# Patient Record
Sex: FEMALE | Race: WHITE | Employment: FULL TIME | URBAN - METROPOLITAN AREA
[De-identification: names, ages, dates, MRNs, and addresses within clinical notes are randomized per-mention and may not be internally consistent; named-entity substitution may affect disease eponyms.]

---

## 2024-06-24 ENCOUNTER — HOSPITAL ENCOUNTER (OUTPATIENT)
Dept: RADIOLOGY | Facility: CLINIC | Age: 64
Discharge: HOME | End: 2024-06-24
Payer: COMMERCIAL

## 2024-06-24 ENCOUNTER — OFFICE VISIT (OUTPATIENT)
Dept: ORTHOPEDIC SURGERY | Facility: CLINIC | Age: 64
End: 2024-06-24
Payer: COMMERCIAL

## 2024-06-24 DIAGNOSIS — M54.16 LUMBAR RADICULOPATHY: Primary | ICD-10-CM

## 2024-06-24 DIAGNOSIS — M48.061 DEGENERATIVE LUMBAR SPINAL STENOSIS: ICD-10-CM

## 2024-06-24 DIAGNOSIS — M54.50 ACUTE LEFT-SIDED LOW BACK PAIN, UNSPECIFIED WHETHER SCIATICA PRESENT: ICD-10-CM

## 2024-06-24 PROCEDURE — 99213 OFFICE O/P EST LOW 20 MIN: CPT | Performed by: INTERNAL MEDICINE

## 2024-06-24 PROCEDURE — 99203 OFFICE O/P NEW LOW 30 MIN: CPT | Performed by: INTERNAL MEDICINE

## 2024-06-24 PROCEDURE — 72110 X-RAY EXAM L-2 SPINE 4/>VWS: CPT | Performed by: INTERNAL MEDICINE

## 2024-06-24 PROCEDURE — 72110 X-RAY EXAM L-2 SPINE 4/>VWS: CPT

## 2024-06-24 NOTE — PROGRESS NOTES
Acute Injury New Patient Visit    CC:   Chief Complaint   Patient presents with    Lower Back - Pain     Low back pain radiating down left leg  Xrays today       HPI: Ada is a 64 y.o. female presents today for evaluation for low back injury sustained after a fall last week. She notes worsening pain since the weekend. She saw a chiropractor. She notes pain radiating down her left leg. She is a physiotherapist. She is here for initial evaluation and x-rays.  She was prescribed a Medrol Dosepak which she has not taken yet.  She denies any stool or urine incontinence.        Review of Systems   GENERAL: Negative for malaise, significant weight loss, fever  MUSCULOSKELETAL: See HPI  NEURO:  Negative for numbness / tingling     Past Medical History  No past medical history on file.    Medication review  Medication Documentation Review Audit    **Prior to Admission medications have not yet been reviewed**         Allergies  Not on File    Social History  Social History     Socioeconomic History    Marital status: Single     Spouse name: Not on file    Number of children: Not on file    Years of education: Not on file    Highest education level: Not on file   Occupational History    Not on file   Tobacco Use    Smoking status: Not on file    Smokeless tobacco: Not on file   Substance and Sexual Activity    Alcohol use: Not on file    Drug use: Not on file    Sexual activity: Not on file   Other Topics Concern    Not on file   Social History Narrative    Not on file     Social Determinants of Health     Financial Resource Strain: Low Risk  (12/17/2023)    Received from [facility]    Overall Financial Resource Strain (CARDIA)     Difficulty of Paying Living Expenses: Not hard at all   Food Insecurity: No Food Insecurity (12/17/2023)    Received from [facility]    Hunger Vital Sign     Worried About Running Out of Food in the Last Year: Never true     Ran Out of Food in the Last Year: Never true   Transportation Needs: No  Transportation Needs (12/17/2023)    Received from [facility]    PRAPARE - Transportation     Lack of Transportation (Medical): No     Lack of Transportation (Non-Medical): No   Physical Activity: Inactive (12/17/2023)    Received from [facility]    Exercise Vital Sign     Days of Exercise per Week: 0 days     Minutes of Exercise per Session: 0 min   Stress: No Stress Concern Present (12/17/2023)    Received from [facility]    [employer] - Occupational Stress Questionnaire     Feeling of Stress : Only a little   Social Connections: Socially Isolated (12/17/2023)    Received from [facility]    Social Connection and Isolation Panel [NHANES]     Frequency of Communication with Friends and Family: Once a week     Frequency of Social Gatherings with Friends and Family: Once a week     Attends L[employer]-day Services: Never     Active Member of Clubs or Organizations: No     Attends Club or Organization Meetings: Never     Marital Status:    Intimate Partner Violence: Not At Risk (12/17/2023)    Received from [facility]    Humiliation, Afraid, Rape, and Kick questionnaire     Fear of Current or Ex-Partner: No     Emotionally Abused: No     Physically Abused: No     Sexually Abused: No   Housing Stability: Low Risk  (12/17/2023)    Received from [facility]    Housing Stability Vital Sign     Unable to Pay for Housing in the Last Year: No     Number of Places Lived in the Last Year: 1     Unstable Housing in the Last Year: No       Surgical History  No past surgical history on file.    Physical Exam:  GENERAL:  Patient is awake, alert, and oriented to person place and time.  Patient appears well nourished and well kept.  Affect Calm, Not Acutely Distressed.  HEENT:  Normocephalic, Atraumatic, EOMI  CARDIOVASCULAR:  Hemodynamically stable.  RESPIRATORY:  Normal respirations with unlabored breathing.  Extremity: Lumbar spine examination shows skin is intact.  Mild midline lumbar  tenderness.  Mild paraspinal tenderness.  She can forward flex with no pain.  Pain with reverse extension.  Quadricep strength is 5 / 5 in the left and right.  Positive straight leg test on the left leg.  She is able to walk on tiptoes and heels.  Distal pulses are palpable.      Diagnostics: X-rays reviewed      Procedure: None    Assessment:   Lumbar DDD  Lumbar stenosis with left-sided lumbar radiculopathy    Plan: Ada presents today for initial evaluation for acute right low back pain which started last week. X-rays showed no obvious fractures, with scoliosis and arthritic changes. We recommended MRI of the lumbar spine to evaluate for lumbar stenosis and for persistent left-sided lumbar radiculopathy and for preoperative planning, and will also begin physical therapy. She will continue t oral steroids and shwe will follow-up with the spine team after the MRI.     Orders Placed This Encounter    XR lumbar spine complete 4+ views      At the conclusion of the visit there were no further questions by the patient/family regarding their plan of care.  Patient was instructed to call or return with any issues, questions, or concerns regarding their injury and/or treatment plan described above.     06/24/24 at 11:32 AM - Tomás Dick MD  Scribe Attestation  By signing my name below, I, Sukhjinder Sally Mckoy   attest that this documentation has been prepared under the direction and in the presence of Tomás Dick MD.    Office: [phone]    This note was prepared using voice recognition software.  The details of this note are correct and have been reviewed, and corrected to the best of my ability.  Some grammatical errors may persist related to the Dragon software.

## 2024-07-03 ENCOUNTER — OFFICE VISIT (OUTPATIENT)
Dept: ORTHOPEDIC SURGERY | Facility: CLINIC | Age: 64
End: 2024-07-03
Payer: COMMERCIAL

## 2024-07-03 DIAGNOSIS — M54.16 LUMBAR RADICULOPATHY: Primary | ICD-10-CM

## 2024-07-03 PROCEDURE — 99214 OFFICE O/P EST MOD 30 MIN: CPT | Performed by: PHYSICIAN ASSISTANT

## 2024-07-03 RX ORDER — MELOXICAM 15 MG/1
15 TABLET ORAL DAILY
Qty: 30 TABLET | Refills: 0 | Status: SHIPPED | OUTPATIENT
Start: 2024-07-03 | End: 2024-08-02

## 2024-07-03 NOTE — PROGRESS NOTES
New patient just established with the practice comes to the office complaining of low back pain left leg pain.  She had a history of chronic history of low back pain but after the fall now she has the leg pain which goes all the way down the leg to the foot with numbness and tingling.  She saw one of her colleagues who has been on a Medrol Dosepak she not really taking anything else.  She is in physical therapy and the therapist told her that he believes he has she has a herniated disc at L4-5.  Patient denies bowel or bladder complaints saddle anesthesia gait or balance changes.  Denies any spine surgery.    Looked at patient's recent blood work.  Check a metabolic panel which showed glucose 95 sodium 141 potassium 4.3 we checked a vitamin D level which is 53 we also checked primary doctors note check medication list I do not see that patient is on a type of statin medication to cause muscular aches and pains in the lower extremities.    Physical exam: Well-nourished, well kept.  No lymphangitis or lymphadenopathy in the examined extremities.  Good perfusion to the extremities ×4.  Radial and dorsalis pedis pulses 2+.  Capillary refill to all 4 digits brisk.  No distal edema x 4.  Patient in a wheelchair secondary to pain but able to stand and walk.  Examination of the neck reveals no swelling, step-off, or point tenderness.  Range of motion with flexion, extension, side bending and rotation is well maintained without crepitance, instability, or exacerbation of pain.  Strength is within normal limits.  Decreased range of motion flexion-extension rotation lumbar spine tender good strength no instability.  Examination of the upper extremities reveals no point tenderness, swelling, or deformity.  Range of motion of the shoulders, elbows, wrists, and fingers are all full without crepitance, instability, or exacerbation of pain.  Strength is 5/5 throughout.  Some mild weakness on dorsiflexion on the left lower extremity  otherwise examination of the lower extremities reveals no point tenderness, swelling, or deformity.  Range of motion of the hips, knees, and ankles are full without crepitance, instability, or exacerbation of pain.  Strength is 5/5 throughout.  No redness, abrasions, or lesions on all 4 extremities, head and neck, or trunk.  Gross sensation intact in the extremities ×4.  Deep tendon reflexes 2+ and symmetric bilaterally.  Zohaib, clonus, and Babinski were negative.  Straight leg raise negative.  Affect normal.  Alert and oriented ×3.  Coordination normal.    X-ray: X-ray lumbar spine taken today and reviewed shows a scoliosis measuring about 17 degrees.  There is a spondylolisthesis L4-5 which is slightly dynamic on flexion-extension and severe atherosclerotic disease noted in the descending vessels.  Looks like there be a mild compression of L3.  Not quite sure how true that is there is a shadow on that x-ray.    Assessment: This a patient with low back pain radicular pain chronic history of back pain.  With now acute exacerbation of symptoms with leg symptoms all the way down to the foot with sensory changes also.  Also due to the increase in pain we had concerns of a stress fracture so we need to get a MRI soon as possible.    Plan: Will try an anti-inflammatory for the patient and we will see her back after the MRI

## 2024-07-12 ENCOUNTER — APPOINTMENT (OUTPATIENT)
Dept: PHYSICAL THERAPY | Facility: CLINIC | Age: 64
End: 2024-07-12
Payer: COMMERCIAL

## 2024-07-19 ENCOUNTER — HOSPITAL ENCOUNTER (OUTPATIENT)
Dept: RADIOLOGY | Facility: HOSPITAL | Age: 64
Discharge: HOME | End: 2024-07-19
Payer: COMMERCIAL

## 2024-07-19 DIAGNOSIS — M54.16 LUMBAR RADICULOPATHY: ICD-10-CM

## 2024-07-19 DIAGNOSIS — M48.061 DEGENERATIVE LUMBAR SPINAL STENOSIS: ICD-10-CM

## 2024-07-19 DIAGNOSIS — M54.50 ACUTE LEFT-SIDED LOW BACK PAIN, UNSPECIFIED WHETHER SCIATICA PRESENT: ICD-10-CM

## 2024-07-19 PROCEDURE — 72148 MRI LUMBAR SPINE W/O DYE: CPT

## 2024-07-31 ENCOUNTER — OFFICE VISIT (OUTPATIENT)
Dept: ORTHOPEDIC SURGERY | Facility: CLINIC | Age: 64
End: 2024-07-31
Payer: COMMERCIAL

## 2024-07-31 DIAGNOSIS — S32.000A LUMBAR COMPRESSION FRACTURE, CLOSED, INITIAL ENCOUNTER (MULTI): Primary | ICD-10-CM

## 2024-07-31 PROCEDURE — 99213 OFFICE O/P EST LOW 20 MIN: CPT | Performed by: PHYSICIAN ASSISTANT

## 2024-07-31 PROCEDURE — 99214 OFFICE O/P EST MOD 30 MIN: CPT | Performed by: PHYSICIAN ASSISTANT

## 2024-07-31 NOTE — PROGRESS NOTES
Established patient follow-up MRI lumbar spine.  Patient states her back pain is tolerable actually getting better.  Leg pain is actually even better.  So she is doing fairly well.  She has a follow-up with the elbow surgeon regarding her elbow fracture.  Patient denies radiculopathy bowel or bladder complaints saddle anesthesia.    Physical exam: Well-nourished, well kept.  No lymphangitis or lymphadenopathy in the examined extremities.  Good perfusion to the lower extremities bilaterally.  Dorsalis pedis pulses 2+.  Capillary refill to the digits brisk.  No distal edema.  Gait normal.  Can walk on heels and toes.  Examination of the back reveals no swelling, step-off, or point tenderness.  Range of motion with flexion, extension, side bending and rotation is well maintained without crepitance, instability, or exacerbation of pain.  Strength is within normal limits.  Examination of the lower extremities reveals no point tenderness, swelling, or deformity.  Range of motion of the hips, knees, and ankles are full without crepitance, instability, or exacerbation of pain.  Strength is 5/5 throughout.  No redness, abrasions, or lesions on all 4 extremities, head and neck, or trunk.  Gross sensation intact in the extremities ×4.  Deep tendon reflexes 2+ and symmetric bilaterally.  Zohaib, clonus, and Babinski were negative.  Straight leg raise negative.  Affect normal.  Alert and oriented ×3.  Coordination normal.    MRI: MRI reviewed report is reviewed as well showing compression fracture of superior endplate of L3 and L5 there is some epidural lipomatosis.  But no major disc herniations central stenosis or foraminal stenosis.    Assessment: This a patient with compression fractures L3 L5 pain is very tolerable very minimal at this point.  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  She still seeing the orthopedic elbow surgeon also.    Plan: From our standpoint orthopedic spine standpoint she is cleared to  go to work whenever she is cleared from the elbow surgeon.  She will follow-up with us as needed

## 2024-07-31 NOTE — LETTER
July 31, 2024     Patient: Ada Caceres   YOB: 1960   Date of Visit: 7/31/2024       To Whom it May Concern:    Ada Caceres was seen in my clinic on 7/31/2024. She was seen in our office for follow-up for back pain.  Diagnosed with compression fractures.  Patient is able to return to work from our standpoint when she is cleared from the orthopedic elbow surgeon..    If you have any questions or concerns, please don't hesitate to call.         Sincerely,          Ty Mcintosh PA-C        CC: No Recipients

## 2024-08-01 DIAGNOSIS — M48.061 DEGENERATIVE LUMBAR SPINAL STENOSIS: ICD-10-CM

## 2024-08-01 DIAGNOSIS — M54.16 LUMBAR RADICULOPATHY: ICD-10-CM

## 2024-08-01 DIAGNOSIS — M54.50 ACUTE LEFT-SIDED LOW BACK PAIN, UNSPECIFIED WHETHER SCIATICA PRESENT: ICD-10-CM

## 2024-08-01 DIAGNOSIS — S32.000A LUMBAR COMPRESSION FRACTURE, CLOSED, INITIAL ENCOUNTER (MULTI): ICD-10-CM

## 2024-08-01 RX ORDER — MELOXICAM 15 MG/1
15 TABLET ORAL DAILY
Qty: 30 TABLET | Refills: 0 | Status: SHIPPED | OUTPATIENT
Start: 2024-08-01 | End: 2024-08-31

## 2024-08-13 ENCOUNTER — TELEPHONE (OUTPATIENT)
Dept: ORTHOPEDIC SURGERY | Facility: CLINIC | Age: 64
End: 2024-08-13
Payer: COMMERCIAL

## 2024-08-13 NOTE — TELEPHONE ENCOUNTER
Maxwell from PT has questions about Ada and advancing her lower back exercise. He would like a call back at [phone]